# Patient Record
Sex: MALE | Race: WHITE | HISPANIC OR LATINO | URBAN - METROPOLITAN AREA
[De-identification: names, ages, dates, MRNs, and addresses within clinical notes are randomized per-mention and may not be internally consistent; named-entity substitution may affect disease eponyms.]

---

## 2017-07-09 ENCOUNTER — EMERGENCY (EMERGENCY)
Facility: HOSPITAL | Age: 32
LOS: 1 days | Discharge: HOME | End: 2017-07-09
Attending: EMERGENCY MEDICINE
Payer: SELF-PAY

## 2017-07-09 VITALS
TEMPERATURE: 99 F | HEIGHT: 63 IN | SYSTOLIC BLOOD PRESSURE: 110 MMHG | HEART RATE: 89 BPM | WEIGHT: 149.91 LBS | OXYGEN SATURATION: 96 % | RESPIRATION RATE: 18 BRPM | DIASTOLIC BLOOD PRESSURE: 66 MMHG

## 2017-07-09 VITALS
SYSTOLIC BLOOD PRESSURE: 114 MMHG | RESPIRATION RATE: 16 BRPM | HEART RATE: 69 BPM | TEMPERATURE: 98 F | DIASTOLIC BLOOD PRESSURE: 69 MMHG | OXYGEN SATURATION: 98 %

## 2017-07-09 PROCEDURE — 65222 REMOVE FOREIGN BODY FROM EYE: CPT

## 2017-07-09 PROCEDURE — 99283 EMERGENCY DEPT VISIT LOW MDM: CPT | Mod: 25

## 2017-07-09 PROCEDURE — T1013: CPT

## 2017-07-09 PROCEDURE — 90715 TDAP VACCINE 7 YRS/> IM: CPT

## 2017-07-09 PROCEDURE — 90471 IMMUNIZATION ADMIN: CPT

## 2017-07-09 PROCEDURE — 65222 REMOVE FOREIGN BODY FROM EYE: CPT | Mod: RT

## 2017-07-09 RX ORDER — OFLOXACIN 0.3 %
1 DROPS OPHTHALMIC (EYE) ONCE
Qty: 0 | Refills: 0 | Status: COMPLETED | OUTPATIENT
Start: 2017-07-09 | End: 2017-07-09

## 2017-07-09 RX ORDER — TETANUS TOXOID, REDUCED DIPHTHERIA TOXOID AND ACELLULAR PERTUSSIS VACCINE, ADSORBED 5; 2.5; 8; 8; 2.5 [IU]/.5ML; [IU]/.5ML; UG/.5ML; UG/.5ML; UG/.5ML
0.5 SUSPENSION INTRAMUSCULAR ONCE
Qty: 0 | Refills: 0 | Status: COMPLETED | OUTPATIENT
Start: 2017-07-09 | End: 2017-07-09

## 2017-07-09 RX ORDER — GENTAMICIN SULFATE 3 MG/ML
1 SOLUTION/ DROPS OPHTHALMIC
Qty: 1 | Refills: 0 | OUTPATIENT
Start: 2017-07-09 | End: 2017-07-16

## 2017-07-09 RX ADMIN — TETANUS TOXOID, REDUCED DIPHTHERIA TOXOID AND ACELLULAR PERTUSSIS VACCINE, ADSORBED 0.5 MILLILITER(S): 5; 2.5; 8; 8; 2.5 SUSPENSION INTRAMUSCULAR at 17:07

## 2017-07-09 RX ADMIN — Medication 1 DROP(S): at 16:55

## 2017-07-09 RX ADMIN — Medication 1 DROP(S): at 17:30

## 2017-07-09 NOTE — ED STATDOCS - ATTENDING CONTRIBUTION TO CARE
I, Freddie Voss, performed the initial face to face bedside interview with this patient regarding history of present illness, review of symptoms and relevant past medical, social and family history.  I completed an independent physical examination.  I was the initial provider who evaluated this patient. I have signed out the follow up of any pending tests (i.e. labs, radiological studies) to the ACP.  I have communicated the patient’s plan of care and disposition with the ACP.

## 2017-07-09 NOTE — ED PROCEDURE NOTE - PROCEDURE ADDITIONAL DETAILS
partial removal of rust ring using cotton tip swab by Dr. Voss. No complications.
using a 20 gauge needle, manually extracted punctate metallic foreign body; some residual rust ring left behind.

## 2017-07-09 NOTE — ED PROCEDURE NOTE - NS ED ATTENDING STATEMENT MOD
I have personally performed a face to face diagnostic evaluation on this patient. I have reviewed the ACP note and agree with the history, exam and plan of care, except as noted.
Attending Only

## 2017-07-09 NOTE — ED PROCEDURE NOTE - CPROC ED TIME OUT STATEMENT1
“Patient's name, , procedure and correct site were confirmed during the [place] Timeout.”
“Patient's name, , procedure and correct site were confirmed during the [place] Timeout.”

## 2017-07-09 NOTE — ED STATDOCS - OBJECTIVE STATEMENT
32 y/o M pt presents to ED c/o foreign body in his right eye with eye redness x1 day. Pt reports he noticed the sensation today but is unsure when something may have gotten into his eye. 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 Pt denies visual changes, CP, SOB, nausea, vomiting, and headache. No further complaints at this time. NKDA.

## 2017-07-09 NOTE — ED STATDOCS - PROGRESS NOTE DETAILS
Patient presented for evalaution of right eye redness with pain and FB sensation for 2 days. No recall of working with metal or welding. No corrective lenses used.   PE: VA 20/25 bilaterally. SLIT LAMP- + rust ring at 7p. No metallic FB noted of the cornea or the lids. No other stain uptake noted.

## 2017-07-09 NOTE — ED STATDOCS - EYES, MLM
right eye 7 o'clock position on the cornea, punctate foreign body on gross examination. EOMI. PERRL.

## 2022-01-05 NOTE — ED PROCEDURE NOTE - NS_ATTENDINGSCRIBE_ED_ALL_ED
Pt UT interval 0.32 on tele strip. Also, pt unable to stand for CXR. Can he get portable Xray?    I personally performed the service described in the documentation recorded by the scribe in my presence, and it accurately and completely records my words and actions.

## 2022-04-28 NOTE — ED ADULT NURSE NOTE - NS ED NURSE LEVEL OF CONSCIOUSNESS AFFECT
Calm Hydroxychloroquine Pregnancy And Lactation Text: This medication has been shown to cause fetal harm but it isn't assigned a Pregnancy Risk Category. There are small amounts excreted in breast milk.

## 2024-09-13 NOTE — ED STATDOCS - CONDUCTED A DETAILED DISCUSSION WITH PATIENT AND/OR GUARDIAN REGARDING, MDM
return to ED if symptoms worsen, persist or questions arise/need for outpatient follow-up sudden onset